# Patient Record
Sex: MALE | Race: NATIVE HAWAIIAN OR OTHER PACIFIC ISLANDER | NOT HISPANIC OR LATINO | Employment: PART TIME | URBAN - METROPOLITAN AREA
[De-identification: names, ages, dates, MRNs, and addresses within clinical notes are randomized per-mention and may not be internally consistent; named-entity substitution may affect disease eponyms.]

---

## 2017-01-12 ENCOUNTER — OFFICE VISIT - HEALTHEAST (OUTPATIENT)
Dept: INTERNAL MEDICINE | Facility: CLINIC | Age: 32
End: 2017-01-12

## 2017-01-12 DIAGNOSIS — H60.60: ICD-10-CM

## 2017-01-12 DIAGNOSIS — Z00.00 HEALTH MAINTENANCE EXAMINATION: ICD-10-CM

## 2017-01-12 LAB
CHOLEST SERPL-MCNC: 220 MG/DL
FASTING STATUS PATIENT QL REPORTED: ABNORMAL
HDLC SERPL-MCNC: 47 MG/DL
HIV 1+2 AB+HIV1 P24 AG SERPL QL IA: NEGATIVE
LDLC SERPL CALC-MCNC: 163 MG/DL
TRIGL SERPL-MCNC: 51 MG/DL

## 2017-01-12 ASSESSMENT — MIFFLIN-ST. JEOR: SCORE: 1514.02

## 2017-01-13 LAB — SYPHILIS RPR SCREEN - HISTORICAL: NORMAL

## 2017-01-16 ENCOUNTER — COMMUNICATION - HEALTHEAST (OUTPATIENT)
Dept: INTERNAL MEDICINE | Facility: CLINIC | Age: 32
End: 2017-01-16

## 2021-05-30 VITALS — BODY MASS INDEX: 20.23 KG/M2 | HEIGHT: 68 IN | WEIGHT: 133.5 LBS

## 2021-06-08 NOTE — PROGRESS NOTES
ASSESSMENT/PLAN:  1. Health maintenance examination  Overall, he appears relatively healthy.  He did not want immunization but was interested in a vericella titer; this will be done today.  Per his report, was negative for STIs, but since he has a new partner he agreed to check for STIs again.  His blood pressure is normal, but since both of his parents have HTN, we should continue to monitor for development of HTN on subsequent visits. Today we will do a spot check of in blood sugar in the [facility] even though he ate cereal and coffee this morning.  If its high, we will recheck a fasting blood sugar. Similarly, we will do a non fasting cholesterol screen.  He is 31 y.o. and there is no definate indication of a risk for prostate cancer or colon cancer, so was will wait to reassess these at 40 years and 50 years respectively.  He eats healthy, low sodium, low fat, and high in vegetables. He was encouraged to continue this as well as to exercise 3 to 5 times a week.      - Varicella Zoster Immune Status Antibody, IgG  - Glucose  - Lipid Cascade  - HIV Antigen/Antibody Screening [place]  - Syphilis Screen, [place]  - Chlamydia trachomatis & Neisseria gonorrhoeae, Amplified Detection    2. Chronic otitis externa  The right ear erythema is likely irritation from using the cue tips.  The ear wax is impacted. The plan is for him to stop using the cue tips and to use OTC Debrox.  We also decided to place an ENT consult given the it troubles him on a monthly basis.        Agree with above assessment.  He has STD check coming up today with low pretest probability for infection.   nonfasting labs will be checked today.  We will also check titer for varicella since he is unsure he has developed antibodies.  The right ear has been bothering him about once a month, there are some signs of irritation in the ear.  Chronic otitis externa considered versus dermatitis such as seborrheic dermatitis.  Tympanic membrane was obstructed by a  thin layer of cerumen.  I recommended eardrops to help treat this.  Referral to ENT given ongoing problems with the right ear while wearing ear buds.      PLAN:  Patient Instructions   Fasting labs today    Chicken pox titer     Referral to ENT    Ear drops       Orders Placed This Encounter   Procedures     Chlamydia trachomatis & Neisseria gonorrhoeae, Amplified Detection     Order Specific Question:   Specimen Source?     Answer:   Urine     Varicella Zoster Immune Status Antibody, IgG     Glucose     Lipid Cascade     Order Specific Question:   Fasting is required?     Answer:   Yes     HIV Antigen/Antibody Screening [place]     Syphilis Screen, [place]     Ambulatory referral to ENT     Referral Priority:   Routine     Referral Type:   Consultation     Referral Reason:   Evaluation and Treatment     Requested Specialty:   Otolaryngology     Number of Visits Requested:   1       CHIEF COMPLAINT:  Chief Complaint   Patient presents with     [facility] Care     Annual Exam     Ear Pain     off and on. Right ear. Problem with motion     swollen gland back of neck       HISTORY OF PRESENT ILLNESS:  Boy is a 31 y.o. male presenting to the clinic today for a physical and he has right ear pain occasionally with intermittent nausea without vomiting associated with motion on the light rail.  He has occasional tenderness when he puts in his ear buds with concomitant ear ache on the right side. He has bilateral pierced ear lobes.  He stated that he feels well today and that it is not bothering him today.  The tenderness lasts about one week when it is happening and fully resolves.  In the last year he has experienced the ear tenderness monthly.  He recalls that about 3 years ago his physician told him that his right ear appeared infected but he declined treatment. He has experienced this intermittent ear pain and head aches for the last 2 years when putting in ear buds.     Health Maintenance: He has had health  insurance for 3 years, but for over ten years, he reported not receiving medical care or physicals because he did not have health insurance.  He has not had an influenza shot today, and he is undecided whether he wants one today.  He stated that in general he does not like to take vaccines, and he is unsure whether they are up to date. He has not had a physical in many years.  He signed forms for [facility] and [facility] for past records.  He states that his hearing has been unchanged for several years.  He is unsure if he ever contracted chicken pox as a child, but does not believe he did.     STI: He is sexually active with his current girl friend, but denies a history of sexually transmitted illnesses.  He said that he was tested and was negative 3 years ago at [facility], but he has changed partners since then. His partner has been tested for STIs and she was negative.     Heart History: He stated that 3 years ago he was told that he had a heart murmur, and he underwent an echocardiogram, which was negative.     Eye/dental exams: He has not had an eye exam for eight years, but he wears glasses. His first dental appointment in three years was last week.     REVIEW OF SYSTEMS:   He denies testicle nodules or bulges in the groin. He denies any history of headaches, loss of coordination, asthma, tuberculosis, thyroid disease, kidney disease, liver disease, or GI disease; his stool pattern is daily soft and without hematochezia.  He denies any ear discharge, loss of hearing, post nasal drip, sinus pain, cough, fever, or chills.  All other systems are negative.    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  Alcohol: He reported drinking 1-5 drinks of alcohol, spread out in a week.  He denied feeling like he needs a drink make it through the day   Tobacco: he smoked socially up to 4 years ago.  He has not smoked since then.  Cancer: He denies any knowledge of prostate  "cancer in his family.  He has never been diagnoses with cancer of any type.  Diabetes:He denied a history of diabetes, but his mother has DM type 2  Hypertension: He denies and history of hypertension. His blood pressure is 102/64 in clinic today.  Both is mother ans his father has hypertension; his father has type two diabetes.  His paternal grandfather passed away from cancer, but he was unable to tell what type of cancer.      TOBACCO USE:  History   Smoking Status     Former Smoker   Smokeless Tobacco     Not on file       VITALS:  Vitals:    01/12/17 0945   BP: 102/64   Pulse: 64   Weight: 133 lb 8 oz (60.6 kg)   Height: 5' 8.25\" (1.734 m)     Wt Readings from Last 3 Encounters:   01/12/17 133 lb 8 oz (60.6 kg)     Body mass index is 20.15 kg/(m^2).    PHYSICAL EXAM:  General: Alert, pleasant, no distress.   Ears: Scant cerumen in the left ear. Obstructing, dark cerumen on the right, mild irritation and swelling of the right ear canal.   Mouth: Good dentition   Heart: Regular rate and rhythm, no murmurs.   Lungs: Clear.   Abdomen: Soft, nontender, no liver or spleen enlargement.     Student Physical Exam:  General: Revealed alert, pleasant  male, in no acute distress.  Ears: right TM not visible due to cerumen impaction, mild erythema and edema in the canal.  Mouth: no dental carries, scattered fillings.   Neck: supple, no adenopathy.  Heart: regular rate and rhythm without gallops or murmurs.  Chest/Lungs: clear to auscultation, anterior and posterior, symmetric rise and fall.   Abdomen: soft, non-tender, free of bruit or thrill, no hepatic or splenomegaly.        ADDITIONAL HISTORY SUMMARIZED (2): History obtained from Silverio Rees NP student regarding health history.  DECISION TO OBTAIN EXTRA INFORMATION (1):  Release of information obtained.   RADIOLOGY TESTS (1): None.  LABS (1): Labs ordered.  MEDICINE TESTS (1): None.  INDEPENDENT REVIEW (2 each): None.     The visit lasted a total of 24 minutes " face to face with the patient. Over 50% of the time was spent counseling and educating the patient about health maintenance. An untimed portion of this visit was spent with Silverio Rees, an NP student. This portion was spent gathering history and this history along with the history of Dr. Betancourt has been merged as seen above in the HPI, ROS, and PFSH.    IShalom, am scribing for and in the presence of, Dr. Betancourt.    I, Dr. Betancourt, personally performed the services described in this documentation, as scribed by Shalom Mayberry in my presence, and it is both accurate and complete.    MEDICATIONS:  Current Outpatient Prescriptions   Medication Sig Dispense Refill     diphenhydrAMINE (BENADRYL) 50 MG tablet Take 50 mg by mouth bedtime as needed for itching.       carbamide peroxide (DEBROX) 6.5 % otic solution Administer 5 drops into both ears 2 (two) times a day for 2 days.       No current facility-administered medications for this visit.        Total Data Points:  4.